# Patient Record
Sex: FEMALE | Race: WHITE | URBAN - METROPOLITAN AREA
[De-identification: names, ages, dates, MRNs, and addresses within clinical notes are randomized per-mention and may not be internally consistent; named-entity substitution may affect disease eponyms.]

---
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
## 2025-02-07 ENCOUNTER — APPOINTMENT (OUTPATIENT)
Dept: URBAN - METROPOLITAN AREA CLINIC 247 | Age: 20
Setting detail: DERMATOLOGY
End: 2025-02-07

## 2025-02-07 DIAGNOSIS — L70.0 ACNE VULGARIS: ICD-10-CM

## 2025-02-07 PROCEDURE — 99213 OFFICE O/P EST LOW 20 MIN: CPT

## 2025-02-07 PROCEDURE — OTHER PRESCRIPTION: OTHER

## 2025-02-07 PROCEDURE — OTHER OTC TREATMENT REGIMEN: OTHER

## 2025-02-07 PROCEDURE — OTHER PRESCRIPTION MEDICATION MANAGEMENT: OTHER

## 2025-02-07 PROCEDURE — OTHER COUNSELING: OTHER

## 2025-02-07 PROCEDURE — OTHER MIPS QUALITY: OTHER

## 2025-02-07 RX ORDER — AZELAIC ACID 0.15 G/G
GEL TOPICAL
Qty: 50 | Refills: 5 | Status: ERX

## 2025-02-07 RX ORDER — SPIRONOLACTONE 100 MG/1
TABLET, FILM COATED ORAL
Qty: 180 | Refills: 4 | Status: ERX | COMMUNITY
Start: 2025-02-07

## 2025-02-07 ASSESSMENT — LOCATION DETAILED DESCRIPTION DERM: LOCATION DETAILED: RIGHT INFERIOR CENTRAL MALAR CHEEK

## 2025-02-07 ASSESSMENT — LOCATION ZONE DERM: LOCATION ZONE: FACE

## 2025-02-07 ASSESSMENT — LOCATION SIMPLE DESCRIPTION DERM: LOCATION SIMPLE: RIGHT CHEEK

## 2025-02-07 NOTE — PROCEDURE: COUNSELING
Dapsone Pregnancy And Lactation Text: This medication is Pregnancy Category C and is not considered safe during pregnancy or breast feeding.
High Dose Vitamin A Counseling: Side effects reviewed, pt to contact office should one occur.
Spironolactone Pregnancy And Lactation Text: This medication can cause feminization of the male fetus and should be avoided during pregnancy. The active metabolite is also found in breast milk.
Benzoyl Peroxide Counseling: Patient counseled that medicine may cause skin irritation and bleach clothing.  In the event of skin irritation, the patient was advised to reduce the amount of the drug applied or use it less frequently.   The patient verbalized understanding of the proper use and possible adverse effects of benzoyl peroxide.  All of the patient's questions and concerns were addressed.
Topical Clindamycin Pregnancy And Lactation Text: This medication is Pregnancy Category B and is considered safe during pregnancy. It is unknown if it is excreted in breast milk.
Birth Control Pills Pregnancy And Lactation Text: This medication should be avoided if pregnant and for the first 30 days post-partum.
Isotretinoin Counseling: Patient should get monthly blood tests, not donate blood, not drive at night if vision affected, not share medication, and not undergo elective surgery for 6 months after tx completed. Side effects reviewed, pt to contact office should one occur.
Sarecycline Pregnancy And Lactation Text: This medication is Pregnancy Category D and not consider safe during pregnancy. It is also excreted in breast milk.
Azelaic Acid Counseling: Patient counseled that medicine may cause skin irritation and to avoid applying near the eyes.  In the event of skin irritation, the patient was advised to reduce the amount of the drug applied or use it less frequently.   The patient verbalized understanding of the proper use and possible adverse effects of azelaic acid.  All of the patient's questions and concerns were addressed.
Erythromycin Counseling:  I discussed with the patient the risks of erythromycin including but not limited to GI upset, allergic reaction, drug rash, diarrhea, increase in liver enzymes, and yeast infections.
Tazorac Pregnancy And Lactation Text: This medication is not safe during pregnancy. It is unknown if this medication is excreted in breast milk.
Bactrim Pregnancy And Lactation Text: This medication is Pregnancy Category D and is known to cause fetal risk.  It is also excreted in breast milk.
Topical Retinoid Pregnancy And Lactation Text: This medication is Pregnancy Category C. It is unknown if this medication is excreted in breast milk.
Aklief counseling:  Patient advised to apply a pea-sized amount only at bedtime and wait 30 minutes after washing their face before applying.  If too drying, patient may add a non-comedogenic moisturizer.  The most commonly reported side effects including irritation, redness, scaling, dryness, stinging, burning, itching, and increased risk of sunburn.  The patient verbalized understanding of the proper use and possible adverse effects of retinoids.  All of the patient's questions and concerns were addressed.
Include Pregnancy/Lactation Warning?: No
Azithromycin Pregnancy And Lactation Text: This medication is considered safe during pregnancy and is also secreted in breast milk.
Doxycycline Counseling:  Patient counseled regarding possible photosensitivity and increased risk for sunburn.  Patient instructed to avoid sunlight, if possible.  When exposed to sunlight, patients should wear protective clothing, sunglasses, and sunscreen.  The patient was instructed to call the office immediately if the following severe adverse effects occur:  hearing changes, easy bruising/bleeding, severe headache, or vision changes.  The patient verbalized understanding of the proper use and possible adverse effects of doxycycline.  All of the patient's questions and concerns were addressed.
High Dose Vitamin A Pregnancy And Lactation Text: High dose vitamin A therapy is contraindicated during pregnancy and breast feeding.
Winlevi Counseling:  I discussed with the patient the risks of topical clascoterone including but not limited to erythema, scaling, itching, and stinging. Patient voiced their understanding.
Benzoyl Peroxide Pregnancy And Lactation Text: This medication is Pregnancy Category C. It is unknown if benzoyl peroxide is excreted in breast milk.
Topical Sulfur Applications Counseling: Topical Sulfur Counseling: Patient counseled that this medication may cause skin irritation or allergic reactions.  In the event of skin irritation, the patient was advised to reduce the amount of the drug applied or use it less frequently.   The patient verbalized understanding of the proper use and possible adverse effects of topical sulfur application.  All of the patient's questions and concerns were addressed.
Tetracycline Counseling: Patient counseled regarding possible photosensitivity and increased risk for sunburn.  Patient instructed to avoid sunlight, if possible.  When exposed to sunlight, patients should wear protective clothing, sunglasses, and sunscreen.  The patient was instructed to call the office immediately if the following severe adverse effects occur:  hearing changes, easy bruising/bleeding, severe headache, or vision changes.  The patient verbalized understanding of the proper use and possible adverse effects of tetracycline.  All of the patient's questions and concerns were addressed. Patient understands to avoid pregnancy while on therapy due to potential birth defects.
Dapsone Counseling: I discussed with the patient the risks of dapsone including but not limited to hemolytic anemia, agranulocytosis, rashes, methemoglobinemia, kidney failure, peripheral neuropathy, headaches, GI upset, and liver toxicity.  Patients who start dapsone require monitoring including baseline LFTs and weekly CBCs for the first month, then every month thereafter.  The patient verbalized understanding of the proper use and possible adverse effects of dapsone.  All of the patient's questions and concerns were addressed.
Isotretinoin Pregnancy And Lactation Text: This medication is Pregnancy Category X and is considered extremely dangerous during pregnancy. It is unknown if it is excreted in breast milk.
Spironolactone Counseling: Patient advised regarding risks of diarrhea, abdominal pain, hyperkalemia, birth defects (for female patients), liver toxicity and renal toxicity. The patient may need blood work to monitor liver and kidney function and potassium levels while on therapy. The patient verbalized understanding of the proper use and possible adverse effects of spironolactone.  All of the patient's questions and concerns were addressed.
Birth Control Pills Counseling: Birth Control Pill Counseling: I discussed with the patient the potential side effects of OCPs including but not limited to increased risk of stroke, heart attack, thrombophlebitis, deep venous thrombosis, hepatic adenomas, breast changes, GI upset, headaches, and depression.  The patient verbalized understanding of the proper use and possible adverse effects of OCPs. All of the patient's questions and concerns were addressed.
Azelaic Acid Pregnancy And Lactation Text: This medication is considered safe during pregnancy and breast feeding.
Topical Clindamycin Counseling: Patient counseled that this medication may cause skin irritation or allergic reactions.  In the event of skin irritation, the patient was advised to reduce the amount of the drug applied or use it less frequently.   The patient verbalized understanding of the proper use and possible adverse effects of clindamycin.  All of the patient's questions and concerns were addressed.
Aklief Pregnancy And Lactation Text: It is unknown if this medication is safe to use during pregnancy.  It is unknown if this medication is excreted in breast milk.  Breastfeeding women should use the topical cream on the smallest area of the skin for the shortest time needed while breastfeeding.  Do not apply to nipple and areola.
Detail Level: Zone
Erythromycin Pregnancy And Lactation Text: This medication is Pregnancy Category B and is considered safe during pregnancy. It is also excreted in breast milk.
Sarecycline Counseling: Patient advised regarding possible photosensitivity and discoloration of the teeth, skin, lips, tongue and gums.  Patient instructed to avoid sunlight, if possible.  When exposed to sunlight, patients should wear protective clothing, sunglasses, and sunscreen.  The patient was instructed to call the office immediately if the following severe adverse effects occur:  hearing changes, easy bruising/bleeding, severe headache, or vision changes.  The patient verbalized understanding of the proper use and possible adverse effects of sarecycline.  All of the patient's questions and concerns were addressed.
Winlevi Pregnancy And Lactation Text: This medication is considered safe during pregnancy and breastfeeding.
Bactrim Counseling:  I discussed with the patient the risks of sulfa antibiotics including but not limited to GI upset, allergic reaction, drug rash, diarrhea, dizziness, photosensitivity, and yeast infections.  Rarely, more serious reactions can occur including but not limited to aplastic anemia, agranulocytosis, methemoglobinemia, blood dyscrasias, liver or kidney failure, lung infiltrates or desquamative/blistering drug rashes.
Doxycycline Pregnancy And Lactation Text: This medication is Pregnancy Category D and not consider safe during pregnancy. It is also excreted in breast milk but is considered safe for shorter treatment courses.
Minocycline Counseling: Patient advised regarding possible photosensitivity and discoloration of the teeth, skin, lips, tongue and gums.  Patient instructed to avoid sunlight, if possible.  When exposed to sunlight, patients should wear protective clothing, sunglasses, and sunscreen.  The patient was instructed to call the office immediately if the following severe adverse effects occur:  hearing changes, easy bruising/bleeding, severe headache, or vision changes.  The patient verbalized understanding of the proper use and possible adverse effects of minocycline.  All of the patient's questions and concerns were addressed.
Tazorac Counseling:  Patient advised that medication is irritating and drying.  Patient may need to apply sparingly and wash off after an hour before eventually leaving it on overnight.  The patient verbalized understanding of the proper use and possible adverse effects of tazorac.  All of the patient's questions and concerns were addressed.
Azithromycin Counseling:  I discussed with the patient the risks of azithromycin including but not limited to GI upset, allergic reaction, drug rash, diarrhea, and yeast infections.
Topical Retinoid counseling:  Patient advised to apply a pea-sized amount only at bedtime and wait 30 minutes after washing their face before applying.  If too drying, patient may add a non-comedogenic moisturizer. The patient verbalized understanding of the proper use and possible adverse effects of retinoids.  All of the patient's questions and concerns were addressed.
Topical Sulfur Applications Pregnancy And Lactation Text: This medication is Pregnancy Category C and has an unknown safety profile during pregnancy. It is unknown if this topical medication is excreted in breast milk.

## 2025-02-07 NOTE — PROCEDURE: PRESCRIPTION MEDICATION MANAGEMENT
Modify Regimen: Increase Spironolactone 100mg 1 tablet PO QAM, 1/2 tablet PO QHS
Continue Regimen: Azelaic acid 15% gel QAM
Detail Level: Zone
Render In Strict Bullet Format?: No
normal...

## 2025-02-07 NOTE — PROCEDURE: OTC TREATMENT REGIMEN
Patient Specific Otc Recommendations (Will Not Stick From Patient To Patient): Glycolic toner 2- 3 times a week.
Detail Level: Zone

## 2025-05-12 ENCOUNTER — HOSPITAL ENCOUNTER (EMERGENCY)
Age: 20
Discharge: HOME | End: 2025-05-12
Attending: EMERGENCY MEDICINE
Payer: COMMERCIAL

## 2025-05-12 VITALS
DIASTOLIC BLOOD PRESSURE: 78 MMHG | OXYGEN SATURATION: 100 % | RESPIRATION RATE: 18 BRPM | BODY MASS INDEX: 23 KG/M2 | TEMPERATURE: 99 F | SYSTOLIC BLOOD PRESSURE: 118 MMHG | WEIGHT: 125 LBS | HEIGHT: 62 IN | HEART RATE: 84 BPM

## 2025-05-12 DIAGNOSIS — R20.2 PARESTHESIAS: Primary | ICD-10-CM

## 2025-05-12 DIAGNOSIS — E87.6 HYPOKALEMIA: ICD-10-CM

## 2025-05-12 LAB
ANION GAP SERPL CALC-SCNC: 11 MMOL/L (ref 0–18)
B-HCG UR QL: NEGATIVE
BILIRUB UR QL STRIP.AUTO: NEGATIVE
BUN BLD-MCNC: 13 MG/DL (ref 9–23)
CALCIUM BLD-MCNC: 10.5 MG/DL (ref 8.7–10.6)
CHLORIDE SERPL-SCNC: 102 MMOL/L (ref 98–112)
CLARITY UR REFRACT.AUTO: CLEAR
CO2 SERPL-SCNC: 24 MMOL/L (ref 21–32)
COLOR UR AUTO: COLORLESS
CREAT BLD-MCNC: 0.59 MG/DL (ref 0.55–1.02)
EGFRCR SERPLBLD CKD-EPI 2021: 133 ML/MIN/1.73M2 (ref 60–?)
GLUCOSE BLD-MCNC: 94 MG/DL (ref 70–99)
GLUCOSE UR STRIP.AUTO-MCNC: NEGATIVE MG/DL
MAGNESIUM SERPL-MCNC: 1.7 MG/DL (ref 1.6–2.6)
NITRITE UR QL STRIP.AUTO: NEGATIVE
OSMOLALITY SERPL CALC.SUM OF ELEC: 284 MOSM/KG (ref 275–295)
PH UR STRIP.AUTO: 6 [PH] (ref 5–8)
POTASSIUM SERPL-SCNC: 3.4 MMOL/L (ref 3.5–5.1)
PROT UR STRIP.AUTO-MCNC: NEGATIVE MG/DL
RBC UR QL AUTO: NEGATIVE
SODIUM SERPL-SCNC: 137 MMOL/L (ref 136–145)
SP GR UR STRIP.AUTO: 1.01 (ref 1–1.03)
TSI SER-ACNC: 1.04 UIU/ML (ref 0.48–4.17)
UROBILINOGEN UR STRIP.AUTO-MCNC: 0.2 MG/DL

## 2025-05-12 PROCEDURE — 93005 ELECTROCARDIOGRAM TRACING: CPT

## 2025-05-12 PROCEDURE — 84443 ASSAY THYROID STIM HORMONE: CPT | Performed by: EMERGENCY MEDICINE

## 2025-05-12 PROCEDURE — 81001 URINALYSIS AUTO W/SCOPE: CPT

## 2025-05-12 PROCEDURE — 81015 MICROSCOPIC EXAM OF URINE: CPT

## 2025-05-12 PROCEDURE — 80048 BASIC METABOLIC PNL TOTAL CA: CPT | Performed by: EMERGENCY MEDICINE

## 2025-05-12 PROCEDURE — 96360 HYDRATION IV INFUSION INIT: CPT

## 2025-05-12 PROCEDURE — 81001 URINALYSIS AUTO W/SCOPE: CPT | Performed by: EMERGENCY MEDICINE

## 2025-05-12 PROCEDURE — 81025 URINE PREGNANCY TEST: CPT

## 2025-05-12 PROCEDURE — 99284 EMERGENCY DEPT VISIT MOD MDM: CPT

## 2025-05-12 PROCEDURE — 83735 ASSAY OF MAGNESIUM: CPT | Performed by: EMERGENCY MEDICINE

## 2025-05-12 PROCEDURE — 93010 ELECTROCARDIOGRAM REPORT: CPT

## 2025-05-12 RX ORDER — SODIUM CHLORIDE 9 MG/ML
INJECTION, SOLUTION INTRAVENOUS CONTINUOUS
Status: DISCONTINUED | OUTPATIENT
Start: 2025-05-12 | End: 2025-05-12

## 2025-05-12 RX ORDER — POTASSIUM CHLORIDE 1500 MG/1
20 TABLET, EXTENDED RELEASE ORAL ONCE
Status: COMPLETED | OUTPATIENT
Start: 2025-05-12 | End: 2025-05-12

## 2025-05-12 NOTE — ED INITIAL ASSESSMENT (HPI)
While at her friends house about 1 hour ago, she started to have a \"pins and needle feeling\"in her hands that then moved to her face.  States she also has that feeling in her legs.

## 2025-05-13 LAB
ATRIAL RATE: 84 BPM
P AXIS: 57 DEGREES
P-R INTERVAL: 132 MS
Q-T INTERVAL: 356 MS
QRS DURATION: 80 MS
QTC CALCULATION (BEZET): 420 MS
R AXIS: 44 DEGREES
T AXIS: 49 DEGREES
VENTRICULAR RATE: 84 BPM

## 2025-05-13 NOTE — ED PROVIDER NOTES
Patient Seen in: [facility] [place]      History     Chief Complaint   Patient presents with    Other     Stated Complaint: pins and needle feeling over whole body for past hour    Subjective:   HPI  History of Present Illness            This is a 19-year-old female with history of anxiety presents emergency room for evaluation of tingling sensation to her bilateral hands and around her mouth, states symptoms started proxy 1 hour ago when she was at her friend's house.  Patient states she now feels some tingling in her legs as well.  Patient denies chest pain or shortness of breath.  Denies abdominal pain.  Denies nausea vomiting diarrhea.  Denies urinary symptoms.  Reports she does have a history of anxiety, used to take medication but no longer does.  Denies drug or alcohol use.  Denies headache or neck pain.  Denies visual change.  Denies difficulty speaking or swallowing.  Denies any weakness of the extremities.      Objective:     Past Medical History:    Anxiety              Past Surgical History:   Procedure Laterality Date    [place] teeth removed Bilateral 03/08/2024    all 4                Social History     Socioeconomic History    Marital status: Single   Tobacco Use    Smoking status: Never     Passive exposure: Never    Smokeless tobacco: Never   Vaping Use    Vaping status: Never Used   Substance and Sexual Activity    Alcohol use: Never    Drug use: Never                                Physical Exam     ED Triage Vitals [05/12/25 1602]   /88   Pulse 104   Resp 22   Temp 99 °F (37.2 °C)   Temp src Temporal   SpO2 100 %   O2 Device None (Room air)       Current Vitals:   Vital Signs  BP: 118/78  Pulse: 84  Resp: 18  Temp: 98.8 °F (37.1 °C)  Temp src: Oral    Oxygen Therapy  SpO2: 100 %  O2 Device: None (Room air)          Physical Exam  GENERAL: Patient is awake, alert, anxious-appearing, in no acute distress.  HEENT: Pupils equal round reactive to light, extraocular  muscles are intact, there is no scleral icterus.  Mucous membranes are slightly dry, oropharynx is clear, uvula midline.   Scalp is atraumatic.  NECK: Neck is supple, there is no nuchal rigidity.    HEART: Regular rate and rhythm, no murmurs.  LUNGS: Clear to auscultation bilaterally.  No Rales, no rhonchi, no wheezing, no stridor.  ABDOMEN: Soft, nondistended,non tender  EXTREMITIES: No peripheral edema, no calf tenderness  SKIN: Warm, dry, intact, no rashes.  NEUROLOGIC EXAM: Tongue midline, no facial drooping, no ptosis, muscle strength +5/5 bilateral upper and lower extremities cerebellar finger to nose intact, no pronator drift, sensation intact to light and sharp to the bilateral face and bilateral upper and lower extremities.            ED Course     Labs Reviewed   URINALYSIS, ROUTINE - Abnormal; Notable for the following components:       Result Value    Urine Color Colorless (*)     Ketones Urine Trace (*)     Leukocyte Esterase Urine Small (*)     All other components within normal limits   UA MICROSCOPIC ONLY, URINE - Abnormal; Notable for the following components:    Bacteria Urine Rare (*)     Squamous Epi. Cells Few (*)     All other components within normal limits   BASIC METABOLIC PANEL (8) - Abnormal; Notable for the following components:    Potassium 3.4 (*)     All other components within normal limits   MAGNESIUM - Normal   TSH W REFLEX TO FREE T4 - Normal   POCT PREGNANCY URINE - Normal   RAINBOW DRAW LAVENDER     EKG    Rate, intervals and axes as noted on EKG Report.  Rate: 84  Rhythm: Sinus Rhythm  Reading: Normal sinus rhythm.  No ST elevation              Results                              MDM        Differential diagnosis before testing includes but not limited to electrolyte abnormality, thyroid dysfunction, anxiety reaction, which is a medical condition that poses a threat to life/function      Course of Events during Emergency Room Visit include IV established, patient placed on  cardiac monitor and pulse ox.  Patient arrived with very anxious appearing, seem to be breathing heavily/hyperventilating, blood work was obtained.  Chemistry sodium 137 potassium 3.4 BUN 13 creatinine 0.59 glucose 94.  TSH 1.036.  Magnesium 1.7.  Urinalysis negative nitrate small leukocyte esterase, negative pregnancy test.  On reevaluation patient looks much more comfortable vital signs are stable, reports she is feeling better.  I discussed with the patient I suspect anxiety reaction as the etiology of her symptoms.  Recommend close follow-up with primary care, she may require reinstitution of anxiety medication.  Patient agrees with plan well.  Discharged good condition    Shared decision making was utilized         Discharge  I have discussed with the patient the results of test, differential diagnosis, treatment plan, warning signs and symptoms which should prompt immediate return.  They expressed understanding of these instructions and agrees to the following plan provided.  They were given written discharge instructions and agrees to return for any concerns and voiced understanding and all questions were answered.    Note to patient: The 21st Century Cures Act makes medical notes like these available to patients in the interest of transparency. However, this is a medical document intended as peer to peer communication. It is written in medical language and may contain abbreviations or verbiage that are unfamiliar. It may appear blunt or direct. Medical documents are intended to carry relevant information, facts as evident, and the clinical opinion of the practitioner.                 Medical Decision Making      Disposition and Plan     Clinical Impression:  1. Paresthesias    2. Hypokalemia         Disposition:  Discharge  5/12/2025  6:02 pm    Follow-up:  [facility], [place] [address] [address] Coats [address] [place] [id number] [phone]  Call  choose option 1  for general neurology    Jing Lundberg MD  [id number]  [facility] [address]  [phone]    Call            Medications Prescribed:  There are no discharge medications for this patient.            Supplementary Documentation:

## 2025-05-19 ENCOUNTER — OFFICE VISIT (OUTPATIENT)
Dept: FAMILY MEDICINE CLINIC | Facility: CLINIC | Age: 20
End: 2025-05-19
Payer: COMMERCIAL

## 2025-05-19 ENCOUNTER — LAB ENCOUNTER (OUTPATIENT)
Dept: LAB | Age: 20
End: 2025-05-19
Attending: FAMILY MEDICINE
Payer: COMMERCIAL

## 2025-05-19 VITALS
HEIGHT: 62 IN | OXYGEN SATURATION: 99 % | SYSTOLIC BLOOD PRESSURE: 112 MMHG | BODY MASS INDEX: 22.63 KG/M2 | WEIGHT: 123 LBS | HEART RATE: 89 BPM | DIASTOLIC BLOOD PRESSURE: 72 MMHG

## 2025-05-19 DIAGNOSIS — Z00.00 LABORATORY EXAMINATION ORDERED AS PART OF A ROUTINE GENERAL MEDICAL EXAMINATION: ICD-10-CM

## 2025-05-19 DIAGNOSIS — E87.6 HYPOKALEMIA: ICD-10-CM

## 2025-05-19 DIAGNOSIS — R06.02 SOB (SHORTNESS OF BREATH): ICD-10-CM

## 2025-05-19 DIAGNOSIS — E83.42 HYPOMAGNESEMIA: ICD-10-CM

## 2025-05-19 DIAGNOSIS — Z00.00 ROUTINE GENERAL MEDICAL EXAMINATION AT A HEALTH CARE FACILITY: ICD-10-CM

## 2025-05-19 DIAGNOSIS — R20.2 PARESTHESIA: Primary | ICD-10-CM

## 2025-05-19 DIAGNOSIS — R20.2 PARESTHESIA: ICD-10-CM

## 2025-05-19 LAB
ALBUMIN SERPL-MCNC: 4.9 G/DL (ref 3.2–4.8)
ALBUMIN/GLOB SERPL: 2 {RATIO} (ref 1–2)
ALP LIVER SERPL-CCNC: 74 U/L (ref 52–144)
ALT SERPL-CCNC: 10 U/L (ref 10–49)
ANION GAP SERPL CALC-SCNC: 6 MMOL/L (ref 0–18)
AST SERPL-CCNC: 15 U/L (ref ?–34)
BASOPHILS # BLD AUTO: 0.04 X10(3) UL (ref 0–0.2)
BASOPHILS NFR BLD AUTO: 0.8 %
BILIRUB SERPL-MCNC: 0.7 MG/DL (ref 0.3–1.2)
BUN BLD-MCNC: 8 MG/DL (ref 9–23)
CALCIUM BLD-MCNC: 9.7 MG/DL (ref 8.7–10.6)
CHLORIDE SERPL-SCNC: 105 MMOL/L (ref 98–112)
CHOLEST SERPL-MCNC: 177 MG/DL (ref ?–200)
CO2 SERPL-SCNC: 27 MMOL/L (ref 21–32)
CREAT BLD-MCNC: 0.63 MG/DL (ref 0.55–1.02)
D DIMER PPP FEU-MCNC: <0.27 UG/ML FEU (ref ?–0.5)
DEPRECATED HBV CORE AB SER IA-ACNC: 39 NG/ML (ref 50–306)
EGFRCR SERPLBLD CKD-EPI 2021: 131 ML/MIN/1.73M2 (ref 60–?)
EOSINOPHIL # BLD AUTO: 0.18 X10(3) UL (ref 0–0.7)
EOSINOPHIL NFR BLD AUTO: 3.6 %
ERYTHROCYTE [DISTWIDTH] IN BLOOD BY AUTOMATED COUNT: 11.7 %
EST. AVERAGE GLUCOSE BLD GHB EST-MCNC: 94 MG/DL (ref 68–126)
FASTING PATIENT LIPID ANSWER: YES
FASTING STATUS PATIENT QL REPORTED: YES
GLOBULIN PLAS-MCNC: 2.4 G/DL (ref 2–3.5)
GLUCOSE BLD-MCNC: 93 MG/DL (ref 70–99)
HBA1C MFR BLD: 4.9 % (ref ?–5.7)
HCT VFR BLD AUTO: 43.1 % (ref 35–48)
HDLC SERPL-MCNC: 56 MG/DL (ref 40–59)
HGB BLD-MCNC: 14.4 G/DL (ref 12–16)
IMM GRANULOCYTES # BLD AUTO: 0.02 X10(3) UL (ref 0–1)
IMM GRANULOCYTES NFR BLD: 0.4 %
IRON SATN MFR SERPL: 54 % (ref 15–50)
IRON SERPL-MCNC: 173 UG/DL (ref 50–170)
LDLC SERPL CALC-MCNC: 82 MG/DL (ref ?–100)
LYMPHOCYTES # BLD AUTO: 1.28 X10(3) UL (ref 1.5–5)
LYMPHOCYTES NFR BLD AUTO: 25.9 %
MAGNESIUM SERPL-MCNC: 1.7 MG/DL (ref 1.6–2.6)
MCH RBC QN AUTO: 31.9 PG (ref 26–34)
MCHC RBC AUTO-ENTMCNC: 33.4 G/DL (ref 31–37)
MCV RBC AUTO: 95.6 FL (ref 80–100)
MONOCYTES # BLD AUTO: 0.26 X10(3) UL (ref 0.1–1)
MONOCYTES NFR BLD AUTO: 5.3 %
NEUTROPHILS # BLD AUTO: 3.17 X10 (3) UL (ref 1.5–7.7)
NEUTROPHILS # BLD AUTO: 3.17 X10(3) UL (ref 1.5–7.7)
NEUTROPHILS NFR BLD AUTO: 64 %
NONHDLC SERPL-MCNC: 121 MG/DL (ref ?–130)
OSMOLALITY SERPL CALC.SUM OF ELEC: 284 MOSM/KG (ref 275–295)
PLATELET # BLD AUTO: 302 10(3)UL (ref 150–450)
POTASSIUM SERPL-SCNC: 3.8 MMOL/L (ref 3.5–5.1)
PROT SERPL-MCNC: 7.3 G/DL (ref 5.7–8.2)
RBC # BLD AUTO: 4.51 X10(6)UL (ref 3.8–5.3)
SODIUM SERPL-SCNC: 138 MMOL/L (ref 136–145)
TOTAL IRON BINDING CAPACITY: 322 UG/DL (ref 250–425)
TRANSFERRIN SERPL-MCNC: 256 MG/DL (ref 250–380)
TRIGL SERPL-MCNC: 236 MG/DL (ref 30–149)
TSI SER-ACNC: 0.88 UIU/ML (ref 0.48–4.17)
VIT B12 SERPL-MCNC: 368 PG/ML (ref 211–911)
VIT D+METAB SERPL-MCNC: 17.9 NG/ML (ref 30–100)
VLDLC SERPL CALC-MCNC: 37 MG/DL (ref 0–30)
WBC # BLD AUTO: 5 X10(3) UL (ref 4–11)

## 2025-05-19 PROCEDURE — 84443 ASSAY THYROID STIM HORMONE: CPT

## 2025-05-19 PROCEDURE — 80061 LIPID PANEL: CPT

## 2025-05-19 PROCEDURE — 85379 FIBRIN DEGRADATION QUANT: CPT

## 2025-05-19 PROCEDURE — 83550 IRON BINDING TEST: CPT

## 2025-05-19 PROCEDURE — 83036 HEMOGLOBIN GLYCOSYLATED A1C: CPT

## 2025-05-19 PROCEDURE — 85025 COMPLETE CBC W/AUTO DIFF WBC: CPT

## 2025-05-19 PROCEDURE — 83540 ASSAY OF IRON: CPT

## 2025-05-19 PROCEDURE — 82088 ASSAY OF ALDOSTERONE: CPT

## 2025-05-19 PROCEDURE — 82607 VITAMIN B-12: CPT

## 2025-05-19 PROCEDURE — 82306 VITAMIN D 25 HYDROXY: CPT

## 2025-05-19 PROCEDURE — 83735 ASSAY OF MAGNESIUM: CPT

## 2025-05-19 PROCEDURE — 99214 OFFICE O/P EST MOD 30 MIN: CPT | Performed by: FAMILY MEDICINE

## 2025-05-19 PROCEDURE — 80053 COMPREHEN METABOLIC PANEL: CPT

## 2025-05-19 PROCEDURE — 36415 COLL VENOUS BLD VENIPUNCTURE: CPT

## 2025-05-19 PROCEDURE — 82728 ASSAY OF FERRITIN: CPT

## 2025-05-19 NOTE — PROGRESS NOTES
Frida Delatorre is a 19 year old female presents with SOB, one episode of \"pins and needle sensation\".      HPI:  The patient complaints of SOB, moderate, started:one week ago ,not worse with physical activities,one episode of \"pins and needle sensation\".pt was in ER, low Mg and low K was dgn, pt does not take supplements.  New symptoms for the pt.  No cold, no cough, no wheezing.  Mild anxiety no panic attacks.  No headaches, seizures, no memory issues, no confusion, mood changes, abnormal gait.         Current Medications[1]   Past Medical History[2]   Past Surgical History[3]   Social History:    Short Social Hx on File[4]      REVIEW OF SYSTEMS:   GENERAL HEALTH: feels well otherwise, no weight change, no fatigue, no fever or chills.  EYE: no abnormal vision,no blurry vision  RESPIRATORY: shortness of breath  CARDIOVASCULAR: denies palpitations or orthopnea, no edema.  GI: denies abdominal pain and denies heartburn  NEURO: denies headaches, abnormal sensation.  PSYCH: no panic attacks    EXAM:   /72   Pulse 89   Ht 5' 2\" (1.575 m)   Wt 123 lb (55.8 kg)   LMP 04/29/2025 (Approximate)   SpO2 99%   BMI 22.50 kg/m²   GEN: Pt A, Ox3, NAD, pleasant.  EYES: PEERLa, EOM-i,  Retina normal.  THROAT/ORAL: moist oral mucosa.  NECK: supple, no JVD  LUNGS: CTA maira, normal effort.  HEART: S1/S2 regular, no clicks, gallops, murmurs, no rubs, PMI not displaced.  VASCULAR:Carotids normal, abdominal aorta normal, pedal pulses normal,no edema, no varicosities.  SKIN: normal color.  NEURO:normal reflexes, normal sensation, cranial nerves II-XII intact, no motor deficits.  PSYCH: normal mood.    A/P:   Frida was seen today for follow - up.    Diagnoses and all orders for this visit:    Paresthesia  -     Assay, Thyroid Stim Hormone; Future  -     Vitamin B12; Future  -     Hemoglobin A1C; Future  -     CBC With Differential With Platelet; Future  -     Ferritin; Future  -     Iron And Tibc; Future    Hypokalemia  -      Comp Metabolic Panel (14); Future  -     ALDOSTERONE, SERUM [67528] [Q]; Future    Hypomagnesemia  -     Magnesium [E]; Future  -     ALDOSTERONE, SERUM [[id number]] [Q]; Future    SOB (shortness of breath)  -     CBC With Differential With Platelet; Future  -     Ferritin; Future  -     Iron And Tibc; Future  -     D-Dimer [E]; Future     Referral to spcyhologist if tests nromal.       [1]   No current outpatient medications on file.   [2]   Past Medical History:   Anxiety   [3]   Past Surgical History:  Procedure Laterality Date    [place] teeth removed Bilateral 03/08/2024    all 4   [4]   Social History  Socioeconomic History    Marital status: Single   Tobacco Use    Smoking status: Never     Passive exposure: Never    Smokeless tobacco: Never   Vaping Use    Vaping status: Never Used   Substance and Sexual Activity    Alcohol use: Never    Drug use: Never

## 2025-05-19 NOTE — PATIENT INSTRUCTIONS
Nomi Burks, [facility]    Behavioral Health Clinician,  [facility]    Phone: [phone]  Fax: [phone]    Dr. Lundberg office.              [facility] psychology.  [address] [facility] [address] [address]   Phone: [phone]

## 2025-05-23 LAB — ALDOSTERONE: 2.1 NG/DL

## 2025-07-11 ENCOUNTER — OFFICE VISIT (OUTPATIENT)
Dept: NEUROLOGY | Facility: CLINIC | Age: 20
End: 2025-07-11
Payer: COMMERCIAL

## 2025-07-11 VITALS
RESPIRATION RATE: 16 BRPM | SYSTOLIC BLOOD PRESSURE: 96 MMHG | BODY MASS INDEX: 22 KG/M2 | HEART RATE: 91 BPM | WEIGHT: 119 LBS | DIASTOLIC BLOOD PRESSURE: 61 MMHG

## 2025-07-11 DIAGNOSIS — R20.9 DISTURBANCE OF SKIN SENSATION: Primary | ICD-10-CM

## 2025-07-11 PROCEDURE — 99204 OFFICE O/P NEW MOD 45 MIN: CPT | Performed by: OTHER

## 2025-07-11 RX ORDER — OLOPATADINE HYDROCHLORIDE AND MOMETASONE FUROATE 25; 665 UG/1; UG/1
2 SPRAY, METERED NASAL 2 TIMES DAILY
COMMUNITY
Start: 2025-02-19

## 2025-07-11 RX ORDER — SPIRONOLACTONE 50 MG/1
50 TABLET, FILM COATED ORAL 2 TIMES DAILY
COMMUNITY
Start: 2025-06-23

## 2025-07-11 RX ORDER — FERROUS SULFATE 325(65) MG
325 TABLET, DELAYED RELEASE (ENTERIC COATED) ORAL
COMMUNITY

## 2025-07-11 RX ORDER — ESCITALOPRAM OXALATE 5 MG/1
5 TABLET ORAL DAILY
COMMUNITY
Start: 2025-06-10

## 2025-07-11 NOTE — PROGRESS NOTES
Neurology H&P    Frida Delatorre Patient Status:  No patient class for patient encounter    7/15/2005 MRN [id number]   Location [facility], [address] [place] [address] Attending No att. providers found   Hosp Day # 0 PCP Jing Lundberg MD     Subjective:  Frida Delatorre is a(n) 19 year old female.  History of Present Illness  Frida Delatorre is a 19 year old female who presents with numbness and tingling associated with panic attacks. She has been referred to neurology and hematology for further evaluation.    Numbness and tingling began on May 12th, coinciding with her first panic attack. The sensation is described as 'pins and needles' affecting her whole body during panic attacks, and it can occur randomly, particularly in her fingers, chest, legs, and face. Her fingers sometimes lack full sensation, and tapping her chest results in diminished feeling.    She reports that when she was initially evaluated at a hospital, she was told her symptoms were due to anxiety. Despite this, she continues to experience these symptoms outside of panic attacks. She has been taking Xanax and Lexapro, which were started after the onset of her symptoms, but she does not report significant improvement.    No weakness, loss of consciousness, or shaking spells. She experiences dissociation, describing it as feeling 'like I'm here, but like I don't feel like I'm here.' No changes in vision, double vision, or personal history of seizures. She reports a family history of seizures.    Her previous workup included blood tests which were normal, except for borderline low potassium and magnesium levels during her emergency room visit, which have since normalized. She has been taking vitamin D weekly and iron supplements.     Current Medications:  Current Medications[1]    Problem List:  Problem List[2]    PMHx:  Past Medical History[3]    PSHx:  Past Surgical History[4]    SocHx:  Short Social Hx on File[5]    Family  History:  Family History[6]    Brother with epilepsy    ROS:  10 point ROS completed and was negative, except for pertinent positive and negatives stated in subjective.    Objective/Physical Exam:    Vital Signs:  Blood pressure 96/61, pulse 91, resp. rate 16, weight 119 lb (54 kg), last menstrual period 04/29/2025.    Gen: Awake and in no apparent distress  HEENT: moist mucus membranes  Neck: Supple  Cardiovascular: Regular rate and rhythm, no murmur  Pulm: CTAB  GI: non-tender, normal bowel sounds  Skin: normal, dry  Extremities: No clubbing or cyanosis      Neurologic:   Physical Exam  MENTAL STATUS: alert, ox3, normal attention, language and fund of knowledge.      CRANIAL NERVES II to XII: PERRLA, no ptosis or diplopia, EOM intact, facial sensation intact, strong eye closure, face is symmetric, no dysarthria, tongue midline,  no tongue fasciculations or atrophy, strong shoulder shrug.    MOTOR EXAMINATION: normal tone, no fasciculations, normal strength throughout in UEs and LEs      SENSORY EXAMINATION:  UE: intact to light touch, pinprick intact  LE: intact to light touch, pinprick intact    COORDINATION:  No dysmetria, or intention tremors     REFLEXES: 2+ at biceps, 2+ brachioradialis, 2+ at patella, 2+ at the ankles.     GAIT: normal stance, normal toe gait and heel gait, tandem well.                Labs:       Imaging:  No CNS imaging to review       Assessment & Plan  Paresthesia  Intermittent paresthesia in face, chest, arms, and legs. Not consistent with focal nerve issue, stroke, multiple sclerosis, or significant neurologic condition. Normal neurological exam. Possible anxiety-related.  - Order MRI of the brain to rule out multiple sclerosis or other neurological conditions.  - Order EEG to rule out seizure activity.             Filippo Gonzalez, DO  Neurology       [1]   Current Outpatient Medications   Medication Sig Dispense Refill    escitalopram 5 MG Oral Tab Take 1 tablet (5 mg total) by mouth  daily.      Olopatadine-Mometasone (RYALTRIS) 665-25 MCG/ACT Nasal Suspension 2 sprays by Intranasal route 2 (two) times daily.      spironolactone 50 MG Oral Tab Take 1 tablet (50 mg total) by mouth 2 (two) times daily.      ferrous sulfate 325 (65 FE) MG Oral Tab EC Take 1 tablet (325 mg total) by mouth daily with breakfast.      ALPRAZolam (XANAX) 0.25 MG Oral Tab Take 1 tablet (0.25 mg total) by mouth 2 (two) times daily as needed. 40 tablet 1    ergocalciferol 1.25 MG (47322 UT) Oral Cap Take 1 capsule (50,000 Units total) by mouth once a week. 16 capsule 0   [2]   Patient Active Problem List  Diagnosis    IUD (intrauterine device) in place    Disturbance of skin sensation   [3]   Past Medical History:   Anxiety   [4]   Past Surgical History:  Procedure Laterality Date    [place] teeth removed Bilateral 03/08/2024    all 4   [5]   Social History  Socioeconomic History    Marital status: Single   Tobacco Use    Smoking status: Never     Passive exposure: Never    Smokeless tobacco: Never   Vaping Use    Vaping status: Never Used   Substance and Sexual Activity    Alcohol use: Never    Drug use: Never   Other Topics Concern    Caffeine Concern Yes     Comment: tea    Exercise No     Social Drivers of Health     Food Insecurity: No Food Insecurity (5/19/2025)    NCSS - Food Insecurity     Worried About Running Out of Food in the Last Year: No     Ran Out of Food in the Last Year: No   Transportation Needs: No Transportation Needs (5/19/2025)    NCSS - Transportation     Lack of Transportation: No   Housing Stability: Not At Risk (5/19/2025)    NCSS - Housing/Utilities     Has Housing: Yes     Worried About Losing Housing: No     Unable to Get Utilities: No   [6]   Family History  Problem Relation Age of Onset    Hypertension Father     Other (hyperlipidemia) Father     Cancer Maternal Grandfather     Other (dementia) Maternal Grandfather     Heart Disorder Paternal Grandmother

## 2025-07-11 NOTE — PROGRESS NOTES
Pt reports she continues to have intermittent tingling sensation particularly when she feels like I am in a panic state.          The following individual(s) verbally consented to be recorded using ambient AI listening technology and understand that they can each withdraw their consent to this listening technology at any point by asking the clinician to turn off or pause the recording:    Patient name: Frida Delatorre  Additional names:  Mandy Delatorre

## 2025-07-14 ENCOUNTER — TELEPHONE (OUTPATIENT)
Age: 20
End: 2025-07-14

## 2025-07-17 DIAGNOSIS — F41.9 ANXIETY: ICD-10-CM

## 2025-07-18 NOTE — TELEPHONE ENCOUNTER
06/24/2025  LAST WRITTEN: 05/30/2025  Quantity: 40    Recent Visits  Date Type Provider Dept   05/19/25 Office Visit Jing Lundberg MD [facility]   08/09/24 Office Visit Jing Lundberg MD [facility] [address] [place] [address]   Showing recent visits within past 540 days with a meds authorizing provider and meeting all other requirements  Future Appointments  Date Type Provider Dept   08/29/25 Appointment Jing Lundberg MD [facility] [address] [place] [address]   Showing future appointments within next 150 days with a meds authorizing provider and meeting all other requirements

## 2025-07-19 RX ORDER — ALPRAZOLAM 0.25 MG
0.25 TABLET ORAL 2 TIMES DAILY PRN
Qty: 40 TABLET | Refills: 0 | Status: SHIPPED | OUTPATIENT
Start: 2025-07-19

## 2025-08-01 ENCOUNTER — OFFICE VISIT (OUTPATIENT)
Facility: LOCATION | Age: 20
End: 2025-08-01
Attending: INTERNAL MEDICINE

## 2025-08-01 VITALS
DIASTOLIC BLOOD PRESSURE: 74 MMHG | WEIGHT: 117.81 LBS | TEMPERATURE: 98 F | RESPIRATION RATE: 18 BRPM | OXYGEN SATURATION: 98 % | SYSTOLIC BLOOD PRESSURE: 110 MMHG | BODY MASS INDEX: 22 KG/M2 | HEART RATE: 81 BPM

## 2025-08-01 DIAGNOSIS — E61.1 IRON DEFICIENCY: Primary | ICD-10-CM

## 2025-08-11 ENCOUNTER — OFFICE VISIT (OUTPATIENT)
Facility: LOCATION | Age: 20
End: 2025-08-11
Attending: INTERNAL MEDICINE

## 2025-08-11 VITALS
HEART RATE: 77 BPM | DIASTOLIC BLOOD PRESSURE: 68 MMHG | SYSTOLIC BLOOD PRESSURE: 108 MMHG | OXYGEN SATURATION: 99 % | TEMPERATURE: 98 F | RESPIRATION RATE: 16 BRPM

## 2025-08-11 DIAGNOSIS — E61.1 IRON DEFICIENCY: Primary | ICD-10-CM

## 2025-08-12 ENCOUNTER — TELEPHONE (OUTPATIENT)
Facility: LOCATION | Age: 20
End: 2025-08-12

## (undated) NOTE — Clinical Note
I saw Rip Primrose in the [employer] in [facility] today for viral URI,  she was treated with comfort care. Frida will follow up with you if no better or as needed.  Thank you for the opportunity to care for [id number]

## (undated) NOTE — LETTER
Date & Time: 4/7/2024, 3:58 PM  Patient: Frida Delatorre  Encounter Provider(s):    Prasad Grey PA       To Whom It May Concern:    Frida Delatorre was seen and treated in our department on 4/7/2024. Please excuse from work until seen by ortho.     If you have any questions or concerns, please do not hesitate to call.        _____________________________  Physician/APC Signature